# Patient Record
Sex: MALE | URBAN - METROPOLITAN AREA
[De-identification: names, ages, dates, MRNs, and addresses within clinical notes are randomized per-mention and may not be internally consistent; named-entity substitution may affect disease eponyms.]

---

## 2022-02-25 ENCOUNTER — OFFICE VISIT (OUTPATIENT)
Dept: URBAN - METROPOLITAN AREA CLINIC 60 | Facility: CLINIC | Age: 32
End: 2022-02-25
Payer: COMMERCIAL

## 2022-02-25 DIAGNOSIS — H53.8 OTHER VISUAL DISTURBANCES: Primary | ICD-10-CM

## 2022-02-25 DIAGNOSIS — H35.62 RETINAL HEMORRHAGE, LEFT EYE: ICD-10-CM

## 2022-02-25 PROCEDURE — 92082 INTERMEDIATE VISUAL FIELD XM: CPT | Performed by: OPTOMETRIST

## 2022-02-25 PROCEDURE — 92004 COMPRE OPH EXAM NEW PT 1/>: CPT | Performed by: OPTOMETRIST

## 2022-02-25 PROCEDURE — 92250 FUNDUS PHOTOGRAPHY W/I&R: CPT | Performed by: OPTOMETRIST

## 2022-02-25 ASSESSMENT — INTRAOCULAR PRESSURE
OS: 15
OD: 19

## 2022-02-25 NOTE — IMPRESSION/PLAN
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 Per patient he is not a diabetic. Hemorrhage is not affecting vision. Baseline photos done today to document any changes for future visits.

## 2022-02-25 NOTE — IMPRESSION/PLAN
Impression: Other visual disturbances: H53.8. Plan: No ocular findings to correlate with symptoms. Per patient he was experiencing symptoms prior to starting all medications patient is currently taking. FDT done today to rule out other neurological causes. Results of FDT normal OU. If symptoms should worsen, patient to call office.